# Patient Record
Sex: FEMALE | Race: BLACK OR AFRICAN AMERICAN | NOT HISPANIC OR LATINO | URBAN - METROPOLITAN AREA
[De-identification: names, ages, dates, MRNs, and addresses within clinical notes are randomized per-mention and may not be internally consistent; named-entity substitution may affect disease eponyms.]

---

## 2017-06-20 ENCOUNTER — INPATIENT (INPATIENT)
Facility: HOSPITAL | Age: 67
LOS: 0 days | Discharge: HOME | End: 2017-06-20
Attending: FAMILY MEDICINE | Admitting: FAMILY MEDICINE
Payer: MEDICARE

## 2017-06-20 VITALS
DIASTOLIC BLOOD PRESSURE: 72 MMHG | TEMPERATURE: 98 F | OXYGEN SATURATION: 100 % | RESPIRATION RATE: 18 BRPM | SYSTOLIC BLOOD PRESSURE: 111 MMHG | HEART RATE: 63 BPM

## 2017-06-20 VITALS — HEIGHT: 64 IN | WEIGHT: 179.9 LBS

## 2017-06-20 DIAGNOSIS — R05 COUGH: ICD-10-CM

## 2017-06-20 DIAGNOSIS — E66.9 OBESITY, UNSPECIFIED: ICD-10-CM

## 2017-06-20 DIAGNOSIS — R06.09 OTHER FORMS OF DYSPNEA: ICD-10-CM

## 2017-06-20 DIAGNOSIS — M17.11 UNILATERAL PRIMARY OSTEOARTHRITIS, RIGHT KNEE: ICD-10-CM

## 2017-06-20 DIAGNOSIS — R53.1 WEAKNESS: ICD-10-CM

## 2017-06-20 LAB
24R-OH-CALCIDIOL SERPL-MCNC: 43.6 NG/ML — SIGNIFICANT CHANGE UP (ref 30–100)
ALBUMIN SERPL ELPH-MCNC: 3.3 G/DL — SIGNIFICANT CHANGE UP (ref 3.3–5)
ALP SERPL-CCNC: 94 U/L — SIGNIFICANT CHANGE UP (ref 40–120)
ALT FLD-CCNC: 18 U/L — SIGNIFICANT CHANGE UP (ref 12–78)
ANION GAP SERPL CALC-SCNC: 6 MMOL/L — SIGNIFICANT CHANGE UP (ref 5–17)
APTT BLD: 36 SEC — SIGNIFICANT CHANGE UP (ref 27.5–37.4)
AST SERPL-CCNC: 38 U/L — HIGH (ref 15–37)
BASOPHILS # BLD AUTO: 0.1 K/UL — SIGNIFICANT CHANGE UP (ref 0–0.2)
BASOPHILS NFR BLD AUTO: 1.3 % — SIGNIFICANT CHANGE UP (ref 0–2)
BILIRUB SERPL-MCNC: 1 MG/DL — SIGNIFICANT CHANGE UP (ref 0.2–1.2)
BUN SERPL-MCNC: 16 MG/DL — SIGNIFICANT CHANGE UP (ref 7–23)
CALCIUM SERPL-MCNC: 9.1 MG/DL — SIGNIFICANT CHANGE UP (ref 8.5–10.1)
CHLORIDE SERPL-SCNC: 107 MMOL/L — SIGNIFICANT CHANGE UP (ref 96–108)
CK SERPL-CCNC: 131 U/L — SIGNIFICANT CHANGE UP (ref 26–192)
CK SERPL-CCNC: 153 U/L — SIGNIFICANT CHANGE UP (ref 26–192)
CO2 SERPL-SCNC: 29 MMOL/L — SIGNIFICANT CHANGE UP (ref 22–31)
CREAT SERPL-MCNC: 0.98 MG/DL — SIGNIFICANT CHANGE UP (ref 0.5–1.3)
CRP SERPL-MCNC: <0.2 MG/DL — SIGNIFICANT CHANGE UP (ref 0–0.4)
EOSINOPHIL # BLD AUTO: 0.2 K/UL — SIGNIFICANT CHANGE UP (ref 0–0.5)
EOSINOPHIL NFR BLD AUTO: 1.9 % — SIGNIFICANT CHANGE UP (ref 0–6)
ERYTHROCYTE [SEDIMENTATION RATE] IN BLOOD: 23 MM/HR — HIGH (ref 0–20)
GLUCOSE SERPL-MCNC: 89 MG/DL — SIGNIFICANT CHANGE UP (ref 70–99)
HCT VFR BLD CALC: 37.2 % — SIGNIFICANT CHANGE UP (ref 34.5–45)
HGB BLD-MCNC: 12.8 G/DL — SIGNIFICANT CHANGE UP (ref 11.5–15.5)
INR BLD: 1.08 RATIO — SIGNIFICANT CHANGE UP (ref 0.88–1.16)
LYMPHOCYTES # BLD AUTO: 2.1 K/UL — SIGNIFICANT CHANGE UP (ref 1–3.3)
LYMPHOCYTES # BLD AUTO: 26.2 % — SIGNIFICANT CHANGE UP (ref 13–44)
MCHC RBC-ENTMCNC: 32 PG — SIGNIFICANT CHANGE UP (ref 27–34)
MCHC RBC-ENTMCNC: 34.6 GM/DL — SIGNIFICANT CHANGE UP (ref 32–36)
MCV RBC AUTO: 92.8 FL — SIGNIFICANT CHANGE UP (ref 80–100)
MONOCYTES # BLD AUTO: 0.7 K/UL — SIGNIFICANT CHANGE UP (ref 0–0.9)
MONOCYTES NFR BLD AUTO: 8.5 % — SIGNIFICANT CHANGE UP (ref 2–14)
NEUTROPHILS # BLD AUTO: 4.9 K/UL — SIGNIFICANT CHANGE UP (ref 1.8–7.4)
NEUTROPHILS NFR BLD AUTO: 62.1 % — SIGNIFICANT CHANGE UP (ref 43–77)
NT-PROBNP SERPL-SCNC: 36 PG/ML — SIGNIFICANT CHANGE UP (ref 0–125)
PLATELET # BLD AUTO: 207 K/UL — SIGNIFICANT CHANGE UP (ref 150–400)
POTASSIUM SERPL-MCNC: 3.9 MMOL/L — SIGNIFICANT CHANGE UP (ref 3.5–5.3)
POTASSIUM SERPL-SCNC: 3.9 MMOL/L — SIGNIFICANT CHANGE UP (ref 3.5–5.3)
PROT SERPL-MCNC: 7 GM/DL — SIGNIFICANT CHANGE UP (ref 6–8.3)
PROTHROM AB SERPL-ACNC: 11.7 SEC — SIGNIFICANT CHANGE UP (ref 9.8–12.7)
RAPID RVP RESULT: SIGNIFICANT CHANGE UP
RBC # BLD: 4.01 M/UL — SIGNIFICANT CHANGE UP (ref 3.8–5.2)
RBC # FLD: 11.6 % — SIGNIFICANT CHANGE UP (ref 10.3–14.5)
SODIUM SERPL-SCNC: 142 MMOL/L — SIGNIFICANT CHANGE UP (ref 135–145)
TROPONIN I SERPL-MCNC: <0.015 NG/ML — SIGNIFICANT CHANGE UP (ref 0.01–0.04)
TROPONIN I SERPL-MCNC: <0.015 NG/ML — SIGNIFICANT CHANGE UP (ref 0.01–0.04)
TSH SERPL-MCNC: 1.2 UU/ML — SIGNIFICANT CHANGE UP (ref 0.36–3.74)
VIT B12 SERPL-MCNC: >2000 PG/ML — HIGH (ref 243–894)
WBC # BLD: 7.9 K/UL — SIGNIFICANT CHANGE UP (ref 3.8–10.5)
WBC # FLD AUTO: 7.9 K/UL — SIGNIFICANT CHANGE UP (ref 3.8–10.5)

## 2017-06-20 PROCEDURE — 99285 EMERGENCY DEPT VISIT HI MDM: CPT

## 2017-06-20 PROCEDURE — 71020: CPT | Mod: 26

## 2017-06-20 PROCEDURE — 93010 ELECTROCARDIOGRAM REPORT: CPT

## 2017-06-20 PROCEDURE — 71275 CT ANGIOGRAPHY CHEST: CPT | Mod: 26

## 2017-06-20 RX ORDER — AZITHROMYCIN 500 MG/1
500 TABLET, FILM COATED ORAL DAILY
Qty: 0 | Refills: 0 | Status: DISCONTINUED | OUTPATIENT
Start: 2017-06-20 | End: 2017-06-20

## 2017-06-20 RX ORDER — ASPIRIN/CALCIUM CARB/MAGNESIUM 324 MG
324 TABLET ORAL DAILY
Qty: 0 | Refills: 0 | Status: DISCONTINUED | OUTPATIENT
Start: 2017-06-20 | End: 2017-06-20

## 2017-06-20 RX ORDER — SODIUM CHLORIDE 9 MG/ML
3 INJECTION INTRAMUSCULAR; INTRAVENOUS; SUBCUTANEOUS ONCE
Qty: 0 | Refills: 0 | Status: COMPLETED | OUTPATIENT
Start: 2017-06-20 | End: 2017-06-20

## 2017-06-20 RX ORDER — ACETAMINOPHEN 500 MG
650 TABLET ORAL EVERY 6 HOURS
Qty: 0 | Refills: 0 | Status: DISCONTINUED | OUTPATIENT
Start: 2017-06-20 | End: 2017-06-20

## 2017-06-20 RX ORDER — ASPIRIN/CALCIUM CARB/MAGNESIUM 324 MG
1 TABLET ORAL
Qty: 30 | Refills: 0 | OUTPATIENT
Start: 2017-06-20 | End: 2017-07-20

## 2017-06-20 RX ORDER — AZITHROMYCIN 500 MG/1
1 TABLET, FILM COATED ORAL
Qty: 4 | Refills: 0 | OUTPATIENT
Start: 2017-06-20 | End: 2017-06-24

## 2017-06-20 RX ADMIN — Medication 200 MILLIGRAM(S): at 13:39

## 2017-06-20 RX ADMIN — AZITHROMYCIN 500 MILLIGRAM(S): 500 TABLET, FILM COATED ORAL at 13:39

## 2017-06-20 RX ADMIN — SODIUM CHLORIDE 3 MILLILITER(S): 9 INJECTION INTRAMUSCULAR; INTRAVENOUS; SUBCUTANEOUS at 09:44

## 2017-06-20 RX ADMIN — Medication 324 MILLIGRAM(S): at 12:15

## 2017-06-20 NOTE — DISCHARGE NOTE ADULT - PATIENT PORTAL LINK FT
“You can access the FollowHealth Patient Portal, offered by [facility], by registering with the following website: [email or web address]”

## 2017-06-20 NOTE — DISCHARGE NOTE ADULT - HOSPITAL COURSE
68 y/o female with no significant PMH except right knee arthritis presents to  with 2.5 weeks history of productive cough with whitemucus, worse at night, associated with dyspnea on exertion, which is not getting better, some horsiness and generalized weakness. Denies CP, dizziness,  HA, abdominal pain, fever, chills, urinary sx, BM problems, other sx.    In Ed - EKG - NSR no ST-T changes, troponin x1 neg, BNP wnl , D-dimers neg, CXR - neg, WBC wnl, renal panel normal  CTA - no PE  Vital Signs Last 24 Hrs  T(C): 36.7, Max: 36.8 (06-20 @ 08:48)  T(F): 98, Max: 98.2 (06-20 @ 08:48)  HR: 68 (68 - 68)  BP: 128/58 (128/58 - 136/81)  BP(mean): --  RR: 18 (18 - 18)  SpO2: 100% (100% - 100%)    Physical Exam: PHYSICAL EXAM:  Constitutional: NAD, awake and alert, well-developed HEENT: PERR, EOMI, Normal Hearing, MMM Neck: Soft and supple, No LAD, No JVD Respiratory: Breath sounds are clear bilaterally, No wheezing, rales or rhonchi Cardiovascular: S1 and S2, regular rate and rhythm, no Murmurs, gallops or rubs Gastrointestinal: Bowel Sounds present, soft, nontender, nondistended, no guarding, no rebound Extremities: No peripheral edema Vascular: 2+ peripheral pulses Neurological: A/O x 3, no focal deficits Musculoskeletal: 5/5 strength b/l upper and lower extremities Skin: No rashes rectal : deferred, not indicated	    Problem/Plan - 1:  ·  Problem: Productive cough.  Plan: suspected acute viral vs bacterial bronchitis vs GERD  CXR - no PNA  start Robitussin, z-pack  RVP. - pending  CTA - no PE    Problem/Plan - 2:  ·  Problem: ROSALES (dyspnea on exertion).  Plan: d -dimer neg,  CTA negative  O2 sata wnl  tropon x1 neg, , BNP neg. no EKG changes    Problem/Plan - 3:  ·  Problem: Obesity (BMI 30-39.9).  Plan: life style modification.     Problem/Plan - 4:  ·  Problem: Osteoarthritis of right knee, unspecified osteoarthritis type.  Plan: tylenol prn.     Problem/Plan - 5:  ·  Problem: Weakness.  Plan: check TSH, B12, vit D level  outpatient follow up with new PCP.       Attending Statement:  Disposition - medically optimized to be discharged home with close follow up with PCP within 1 week  complete antibiotics  return to ED if fever, abdominal pain, nausea, vomiting, chest pain, dyspnea or other concerns  Discharge plan discussed with patient, RN  Patient advised to follow up with PCP within 3-7 days  time spend 40 min

## 2017-06-20 NOTE — DISCHARGE NOTE ADULT - OTHER SIGNIFICANT FINDINGS
Complete Blood Count + Automated Diff (06.20.17 @ 09:04)    WBC Count: 7.9 K/uL    RBC Count: 4.01 M/uL    Hemoglobin: 12.8 g/dL    Hematocrit: 37.2 %    Mean Cell Volume: 92.8 fl    Mean Cell Hemoglobin: 32.0 pg    Mean Cell Hemoglobin Conc: 34.6 gm/dL    Red Cell Distrib Width: 11.6 %    Platelet Count - Automated: 207 K/uL    Auto Neutrophil #: 4.9 K/uL    Auto Lymphocyte #: 2.1 K/uL    Auto Monocyte #: 0.7 K/uL    Auto Eosinophil #: 0.2 K/uL    Auto Basophil #: 0.1 K/uL    Auto Neutrophil %: 62.1: Differential percentages must be correlated with absolute numbers for  clinical significance. %    Auto Lymphocyte %: 26.2 %    Auto Monocyte %: 8.5 %    Auto Eosinophil %: 1.9 %    Auto Basophil %: 1.3 %    Comprehensive Metabolic Panel (06.20.17 @ 09:04)    Sodium, Serum: 142 mmol/L    Potassium, Serum: 3.9 mmol/L    Chloride, Serum: 107 mmol/L    Carbon Dioxide, Serum: 29 mmol/L    Anion Gap, Serum: 6 mmol/L    Blood Urea Nitrogen, Serum: 16 mg/dL    Creatinine, Serum: 0.98 mg/dL    Glucose, Serum: 89 mg/dL    Calcium, Total Serum: 9.1 mg/dL    Protein Total, Serum: 7.0 gm/dL    Albumin, Serum: 3.3 g/dL    Bilirubin Total, Serum: 1.0 mg/dL    Alkaline Phosphatase, Serum: 94 U/L    Aspartate Aminotransferase (AST/SGOT): 38 U/L    Alanine Aminotransferase (ALT/SGPT): 18 U/L    eGFR if Non : 60: Interpretative comment  The units for eGFR are ml/min/1.73m2 (normalized body surface area). The  eGFR is calculated from a serum creatinine using the CKD-EPI equation.  Other variables required for calculation are race, age and sex. Among  patients w60: ith chronic kidney disease (CKD), the eGFR is useful in  determining the stage of disease according to KDOQI CKD classification.  All eGFR results are reported numerically with the following  interpretation.          GFR                    With60:                  Without     (ml/min/1.73 m2)    Kidney Damage       Kidney Damage        >= 90                    Stage 1                     Normal        60-89                    Stage 2                     Decreased GFR        :      Stage 3                     Stage 3        15-29                    Stage 4                     Stage 4        < 15                      Stage 5                     Stage 5  Each stage of CKD assumes that the associated GFR level has been in  eff60: ect for at least 3 months. Determination of stages one and two (with  eGFR > 59 ml/min/m2) requires estimation of kidney damage for at least 3  months as defined by structural or functional abnormalities.  Limitations: All estimates of GFR will be les60: s accurate for patients at  extremes of muscle mass (including but not limited to frail elderly,  critically ill, or cancer patients), those with unusual diets, and those  with conditions associated with reduced secretion or extrarenal  elimination of60:  creatinine. The eGFR equation is not recommended for use  in patients with unstable creatinine levels. mL/min/1.73M2    eGFR if African American: 69 mL/min/1.73M2    Troponin I, Serum (06.20.17 @ 09:04)    Troponin I, Serum: <0.015: High Sensitivity Troponin and new reference  range effective 7/6/2016 ng/mL    Creatine Kinase, Serum (06.20.17 @ 09:04)    Creatine Kinase, Serum: 153 U/L    Serum Pro-Brain Natriuretic Peptide (06.20.17 @ 09:04)    Serum Pro-Brain Natriuretic Peptide: 36 pg/mL     CT ANGIO CHEST PE PROTOCOL IC                        PROCEDURE DATE:  06/20/2017    INTERPRETATION:  Clinical information: Dyspnea. Rule out pulmonary   embolism  FINDINGS:  LOWER NECK: Within normal limits.  AXILLA, MEDIASTINUM AND CLARISA: No lymphadenopathy.  VESSELS: Adequate opacification of the pulmonary arterial tree. Limited   evaluation of the subsegmental vasculature in the right lung. No embolism   identified. Normal caliber aorta and main pulmonary artery.  HEART: Heart size is normal.No pericardial effusion.  PLEURA: No pleural effusion.  LUNGS AND LARGE AIRWAYS: Patent central airways. No pulmonary nodules.  VISUALIZED UPPER ABDOMEN: Within normal limits.  BONES: No acute abnormality.  CHEST WALL:  Unremarkable  IMPRESSION: No pulmonary embolism.

## 2017-06-20 NOTE — ED PROVIDER NOTE - ATTENDING CONTRIBUTION TO CARE
68 yo F otherwise healthy presents c/o cough with clear phlegm x 2 weeks. +ROSALES. Denies CP. Pt last saw her PCP in [place] last week but did not have work-up since she moved to [place] and would be unable to continue her care. Pt does not smoke. On exam, lungs are clear to auscultation without wheezing, rales, or rhonchi. Agree with Resident's assessment and plan.

## 2017-06-20 NOTE — ED PROVIDER NOTE - OBJECTIVE STATEMENT
66 y/o F no sig medical hx c/o 1-2 week hx of dry cough now with clear sputum x few days. Pt also reports exertional dyspnea x 1 week . Denies any f/c/s, cp, n/v/d, leg swelling. No recent travel. Pt reports having stress test in Jan and was told everything was normal. no sick contacts

## 2017-06-20 NOTE — H&P ADULT - NSHPREVIEWOFSYSTEMS_GEN_ALL_CORE
REVIEW OF SYSTEMS:    CONSTITUTIONAL: No weakness, fevers or chills  EYES/ENT: No visual changes;  No vertigo or throat pain   NECK: No pain or stiffness  RESPIRATORY: + cough, wheezing, hemoptysis; No shortness of breath  CARDIOVASCULAR: No chest pain or palpitations  GASTROINTESTINAL: No abdominal or epigastric pain. No nausea, vomiting, or hematemesis; No diarrhea or constipation. No melena or hematochezia.  GENITOURINARY: No dysuria, frequency or hematuria  NEUROLOGICAL: No numbness or weakness  SKIN: No itching, burning, rashes, or lesions   All other review of systems is negative unless indicated above.

## 2017-06-20 NOTE — DISCHARGE NOTE ADULT - PLAN OF CARE
resolve follow up with PCP within 2- 3 days for further work-up complete antibiotics and use Robitussin for cough as needed, return to ED if chest pain, palpitations, dizziness, headaches, any other concerns follow up with PCP within 1 week for further work-up tylenol prn OTC

## 2017-06-20 NOTE — DISCHARGE NOTE ADULT - SECONDARY DIAGNOSIS.
Productive cough Weakness Osteoarthritis of right knee, unspecified osteoarthritis type Obesity (BMI 30-39.9)

## 2017-06-20 NOTE — H&P ADULT - NSHPLABSRESULTS_GEN_ALL_CORE
06-20    142  |  107  |  16  ----------------------------<  89  3.9   |  29  |  0.98    Ca    9.1      20 Jun 2017 09:04    TPro  7.0  /  Alb  3.3  /  TBili  1.0  /  DBili  x   /  AST  38<H>  /  ALT  18  /  AlkPhos  94  06-20                            12.8   7.9   )-----------( 207      ( 20 Jun 2017 09:04 )             37.2       CARDIAC MARKERS ( 20 Jun 2017 09:04 )  <0.015 ng/mL / x     / 153 U/L / x     / x            LIVER FUNCTIONS - ( 20 Jun 2017 09:04 )  Alb: 3.3 g/dL / Pro: 7.0 gm/dL / ALK PHOS: 94 U/L / ALT: 18 U/L / AST: 38 U/L / GGT: x             PT/INR - ( 20 Jun 2017 09:04 )   PT: 11.7 sec;   INR: 1.08 ratio         PTT - ( 20 Jun 2017 09:04 )  PTT:36.0 sec    CHEST P.A. LATERAL                            PROCEDURE DATE:  06/20/2017        INTERPRETATION:  Exam Date: 6/20/2017 9:39 AM    History: Chest pain and shortness of breath    Technique: Frontal and lateral views of the chest with no prior studies   available for comparison    Findings:    The heart is normal in size.  The lungs are grossly clear. The apices and   hemidiaphragms are unremarkable. Degenerative changes of the visualized   osseous structures.    Kyphosis centered around the lower thoracic spine.    Impression:    No acute disease

## 2017-06-20 NOTE — H&P ADULT - ASSESSMENT
66 y/o female with no significant PMH except right knee arthritis presents to  with 2.5 weeks history of productive cough with whitemucus, worse at night, associated with dyspnea on exertion, which is not getting better, some horsiness and generalized weakness. Denies CP, dizziness,  HA, abdominal pain, fever, chills, urinary sx, BM problems, other sx.    In Ed - EKG - NSR no ST-T changes, troponin x1 neg, BNP wnl , D-dimers neg, CXR - neg, WBC wnl, renal panel normal

## 2017-06-20 NOTE — ED PROVIDER NOTE - PROGRESS NOTE DETAILS
Jovi Carroll for Dr. Rosas: 66 yo F otherwise healthy presents c/o cough with clear phlegm x 2 weeks. +ROSALES. Denies CP. Pt last saw her PCP in [place] last week but did not have work-up since she moved to [place] and would be unable to continue her care. Pt does not smoke. On exam, lungs are clear to auscultation without wheezing, rales, or rhonchi. Jovi Carroll for Dr. Rosas: 66 yo F otherwise healthy presents c/o cough with clear phlegm x 2 weeks. +ROSALES. Denies CP. Pt last saw her PCP in [place] last week but did not have work-up since she moved to [place] and would be unable to continue her care. Pt does not smoke. On exam, lungs are clear to auscultation without wheezing, rales, or rhonchi. Agree with Resident's assessment and plan.

## 2017-06-20 NOTE — ED PROVIDER NOTE - MEDICAL DECISION MAKING DETAILS
66 y/o F no med hx , not on meds c/o 1-2  weeks hx of cough/ exertional dyspnea. Pt hemodyn stable, saturating 100 % on RA. Plan to obtian ekg to r./o stemi/ischemia. labs including bnp /xray chest to r/o chf/pna.

## 2017-06-20 NOTE — DISCHARGE NOTE ADULT - MEDICATION SUMMARY - MEDICATIONS TO TAKE
I will START or STAY ON the medications listed below when I get home from the hospital:    aspirin 81 mg oral tablet  -- 1 tab(s) by mouth once a day  -- Take with food or milk.    -- Indication: For ROSALES (dyspnea on exertion)    guaiFENesin 100 mg/5 mL oral liquid  -- 10 milliliter(s) by mouth every 6 hours  -- Indication: For Productive cough    azithromycin 250 mg oral tablet  -- 1 tab(s) by mouth once a day start on 6/21/17  -- Do not take dairy products, antacids, or iron preparations within one hour of this medication.  Finish all this medication unless otherwise directed by prescriber.    -- Indication: For Productive cough    multivitamin  -- 1 tab(s) by mouth once a day  -- Indication: For vitamins    Calcium 600+D oral tablet  -- 1 tab(s) by mouth 2 times a day  -- Indication: For vitamins

## 2017-06-20 NOTE — DISCHARGE NOTE ADULT - CARE PLAN
Principal Discharge DX:	ROSALES (dyspnea on exertion)  Goal:	resolve  Instructions for follow-up, activity and diet:	follow up with PCP within 2- 3 days for further work-up  Secondary Diagnosis:	Productive cough  Instructions for follow-up, activity and diet:	complete antibiotics and use Robitussin for cough as needed, return to ED if chest pain, palpitations, dizziness, headaches, any other concerns  Secondary Diagnosis:	Weakness  Instructions for follow-up, activity and diet:	follow up with PCP within 1 week for further work-up  Secondary Diagnosis:	Osteoarthritis of right knee, unspecified osteoarthritis type  Instructions for follow-up, activity and diet:	tylenol prn OTC  Secondary Diagnosis:	Obesity (BMI 30-39.9)

## 2017-06-20 NOTE — DISCHARGE NOTE ADULT - ADDITIONAL INSTRUCTIONS
follow up with PCP within 1 week  complete antibiotics  return to ED if fever, abdominal pain, nausea, vomiting, chest pain, dyspnea or other concerns

## 2017-06-20 NOTE — DISCHARGE NOTE ADULT - CARE PROVIDER_API CALL
Alfred Murcia), Family Medicine  [address] [place] [address]  Phone: [phone]  Fax: [phone]    Refugio Carr), Family Medicine  [address] [place] [address]  Phone: [phone]  Fax: [phone]

## 2017-06-20 NOTE — H&P ADULT - ATTENDING COMMENTS
Dispo - no clear indication for admission at this time, CTA, repeat troponin, d/c planning later in the day

## 2017-06-29 DIAGNOSIS — M17.11 UNILATERAL PRIMARY OSTEOARTHRITIS, RIGHT KNEE: ICD-10-CM

## 2017-06-29 DIAGNOSIS — R53.1 WEAKNESS: ICD-10-CM

## 2017-06-29 DIAGNOSIS — R06.00 DYSPNEA, UNSPECIFIED: ICD-10-CM

## 2017-06-29 DIAGNOSIS — R05 COUGH: ICD-10-CM

## 2017-06-29 DIAGNOSIS — E66.9 OBESITY, UNSPECIFIED: ICD-10-CM

## 2021-02-14 ENCOUNTER — EMERGENCY (EMERGENCY)
Facility: HOSPITAL | Age: 71
LOS: 1 days | Discharge: HOME | End: 2021-02-14
Attending: EMERGENCY MEDICINE
Payer: COMMERCIAL

## 2021-02-14 VITALS
TEMPERATURE: 99 F | RESPIRATION RATE: 16 BRPM | SYSTOLIC BLOOD PRESSURE: 143 MMHG | HEART RATE: 75 BPM | DIASTOLIC BLOOD PRESSURE: 72 MMHG | OXYGEN SATURATION: 95 % | WEIGHT: 199.96 LBS | HEIGHT: 62 IN

## 2021-02-14 PROCEDURE — 72170 X-RAY EXAM OF PELVIS: CPT

## 2021-02-14 PROCEDURE — 72125 CT NECK SPINE W/O DYE: CPT | Mod: 26

## 2021-02-14 PROCEDURE — 72125 CT NECK SPINE W/O DYE: CPT

## 2021-02-14 PROCEDURE — 72170 X-RAY EXAM OF PELVIS: CPT | Mod: 26

## 2021-02-14 PROCEDURE — 99284 EMERGENCY DEPT VISIT MOD MDM: CPT | Mod: 25

## 2021-02-14 PROCEDURE — 99285 EMERGENCY DEPT VISIT HI MDM: CPT

## 2021-02-14 PROCEDURE — 70450 CT HEAD/BRAIN W/O DYE: CPT

## 2021-02-14 PROCEDURE — 70450 CT HEAD/BRAIN W/O DYE: CPT | Mod: 26

## 2021-02-14 NOTE — ED PROVIDER NOTE - PHYSICAL EXAMINATION
Constitutional: Awake, Alert. NAD. Well appearing, well nourished. Cooperative. VSS.  HEAD: NC/AT; no signs of trauma   EYES: Conjunctiva and sclera are clear bilaterally. EOMI.   ENT: No rhinorrhea, normal pharynx, oropharynx patent, no tonsillar exudates or enlargement, MMM, no erythema, no drooling or stridor.   NECK: Supple, non-tender. No nuchal rigidity. FROM. No midline or paraspinal TTP.  CARDIOVASCULAR: Normal S1, S2; RRR. No audible murmurs. No chest wall ttp. Radial pulses +2 B/L.  RESPIRATORY: Speaking full sentences. Normal respiratory effort; breath sounds CTAB, No WRR. No accessory muscle use.   ABDOMEN: Soft; NTND. No CVAT B/L. +BS x4 quadrants.  EXTREMITIES: Full passive and active ROM in all extremities; non-tender to palpation; distal pulses palpable and symmetric, no edema, no crepitus or step off.  SKIN: Warm, dry; good skin turgor, no apparent lesions or rashes, no ecchymosis, brisk capillary refill.  NEURO: AAOx3 follows commands. No facial droop. CN 2-12 grossly intact. No truncal ataxia. Steady gait. Moving all ext spontaneously Constitutional: Awake, Alert. NAD. Well appearing, well nourished. Cooperative. VSS.  HEAD: NC/AT; no signs of trauma   EYES: Conjunctiva and sclera are clear bilaterally. EOMI.   ENT: No rhinorrhea, normal pharynx, oropharynx patent, no tonsillar exudates or enlargement, MMM, no erythema, no drooling or stridor.   NECK: Supple, non-tender. No nuchal rigidity. FROM. No midline or paraspinal TTP.  CARDIOVASCULAR: Normal S1, S2; RRR. No audible murmurs. No chest wall ttp. Radial pulses +2 B/L.  RESPIRATORY: Speaking full sentences. Normal respiratory effort; breath sounds CTAB, No WRR. No accessory muscle use.   ABDOMEN: Soft; NTND. No CVAT B/L. +BS x4 quadrants.  EXTREMITIES: Full passive and active ROM in all extremities; non-tender to palpation; distal pulses palpable and symmetric, no edema, no crepitus or step off.  SKIN: Warm, dry; good skin turgor, no apparent lesions or rashes, no ecchymosis, brisk capillary refill.  NEURO: AAOx3 follows commands. No facial droop. CN 2-12 grossly intact. No truncal ataxia. Steady gait. Moving all ext spontaneously. gcs 15

## 2021-02-14 NOTE — ED PROVIDER NOTE - CLINICAL SUMMARY MEDICAL DECISION MAKING FREE TEXT BOX
70 y.o F on ASA/ p/w fall 15 hours ago, mechanical fall on black ice, fell on buttocks and occiput, no loc, currently denying pain    VSS, well aman elderly Female, AAO x 3, speaking full sentences     No signs of facial or head trauma    Neck FROM, no midline or paraspinal ttp,     Low suspicion for ICH, C spine injury/fracture, however Pt is on ASA and is greater than 65 y.o will get Head CT, C spine and Pelvis XR

## 2021-02-14 NOTE — ED ADULT TRIAGE NOTE - CHIEF COMPLAINT QUOTE
Pt. comes ambulating into ED complaining of slip and fall on ice. Pt. denies LOC. Pt. states she took 161 of ASA and 1000 of Tylenol for pain. Pt. denies Blood thinners. Denies headache right now, Denies dizziness. Pt. denies any pain at this time.

## 2021-02-14 NOTE — ED PROVIDER NOTE - OBJECTIVE STATEMENT
70 y.o F with a PMHx of HTN on ASA presents to the ED with c/o fall on black top s/p slipping on ice this morning 4:15 AM. Pt has taken 2 ASA today. Pt states that the impact was quite significant and has concerns for her knees. Pt endorses back of the head absorbing much of the impact associated with the fall but first landed on her buttocks. Pt has no other complaints at this time.     Denies LOC, HA, Dizziness, vision changes, SOB, CP, NVD and bleeding

## 2021-02-14 NOTE — ED PROVIDER NOTE - ADDITIONAL NOTES AND INSTRUCTIONS:
PT was evaluated At [facility] ED and was found to have a condition that warranted time of to rest and heal from WORK/SCHOOL.   Vinnie Meyer PA-C

## 2021-02-14 NOTE — ED PROVIDER NOTE - NSFOLLOWUPINSTRUCTIONS_ED_ALL_ED_FT
Patient education: Taking care of bruises (The Basics)  View in [employer]  Written by the doctors and editors at [facility]  What are bruises?  Bruises happen when blood vessels under the skin break, but the skin isn't cut. Blood leaks into the tissues under the skin. Bruises start off red in color, and then turn blue or purple. As they heal, bruises can turn green and yellow (figure 1). Most bruises heal in 1 to 2 weeks, but some take longer.    Bruises can happen when people get hurt, fall, or bump themselves. People usually have pain and swelling in the area of the bruise. Sometimes, the swelling happens right away. Other times, the swelling starts 1 or 2 days later.    Some people bruise more easily and get worse bruises. These include people who have conditions that keep the blood from clotting normally and people who take medicines to prevent blood clots.    How are bruises treated?  A bruise will get better on its own. But to feel better and help your bruise heal, you can:    ?Put a cold gel pack, bag of ice, or bag of frozen vegetables on the injured area every 1 to 2 hours, for 15 minutes each time. Put a thin towel between the ice (or other cold object) and your skin. Use the ice (or other cold object) for at least 6 hours after your injury. Some people find it helpful to ice longer, even up to 2 days after their injury.    ?Raise the area, if possible – Raising the area above the level of your heart helps to reduce swelling.    ?Take medicine to reduce the pain and swelling – To treat pain, you can take acetaminophen (sample brand name: Tylenol). To treat pain and swelling, you can take ibuprofen (sample brand names: Advil, Motrin). But people who have certain conditions or take certain medicines should not take ibuprofen. If you are unsure, ask your doctor or nurse if you can take ibuprofen.    You should not:    ?Put a warm pack or heating pad on your bruise    ?Stick a needle or other object in your bruise to drain it    When should I call the doctor or nurse?  Call your doctor or nurse if:    ?You get a fever    ?Your bruise causes your joints to swell    ?You can't move or walk because of your bruise    ?You get bruises for no reason or have unusual bleeding, such as from your gums or in your urine

## 2021-02-14 NOTE — ED PROVIDER NOTE - PROGRESS NOTE DETAILS
Vinnie Meyer PA-C:   Pt discussed at length with attending HPI/ROS/PE confirmed Pt seen resting comfortable in no acute distress in chair.   NEURO: A&O x 3, CN II-Xii GI, MAEx 4,  5/5/ motors, = sensation, no pronator drift or ataxia. MS: no midline ttp. strength intact, ARABELLA, no palpable bony abnormalities.   PLAN: PT with stable VS, no acute distress, non toxic appearing, tolerating PO in the ED, Pt with no acute findings, neuro vasc intact, ARABELLA, Pt to be dc home with supportive care, educated about when to return to the ED if needed. PT verbalizes that he understands all instructions and results. Pt informed that ED is open and available 24/7 365 days a yr, encouraged to return to the ED if they have any change in condition, or feel the need for revaluation.

## 2021-02-14 NOTE — ED PROVIDER NOTE - PATIENT PORTAL LINK FT
You can access the FollowMyHealth Patient Portal offered by [facility] by registering at the following website: [email or web address]. By joining [employer]’s FollowMyHealth portal, you will also be able to view your health information using other applications (apps) compatible with our system.

## 2021-02-16 PROBLEM — E66.9 OBESITY, UNSPECIFIED: Chronic | Status: ACTIVE | Noted: 2017-06-20

## 2021-02-16 PROBLEM — M17.11 UNILATERAL PRIMARY OSTEOARTHRITIS, RIGHT KNEE: Chronic | Status: ACTIVE | Noted: 2017-06-20

## 2021-08-17 NOTE — ED ADULT NURSE NOTE - CINV DISCH TEACH PARTICIP
History and Physical    Patient:  Queen John  MRN: [id number]  YOB: 1946    CHIEF COMPLAINT: LUQ and epigastric pain    HISTORY OF PRESENT ILLNESS:   The patient is a 76 y.o. female who presents with LUQ and epigastric pain. CT A/P form 7/14/2021 showed bilateral nonobstructing kidney stones, four kidney stones on the left with largest = 0.5 cm. Patient is on K citrate.      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 10 meq po bid    Past Medical History:    Past Medical History:   Diagnosis Date    Allergic rhinitis     Arthritis     Generalized    Borderline diabetes     Caffeine use     2 coffee day    COPD (chronic obstructive pulmonary disease) (HCC)     EARLY STAGE    GERD (gastroesophageal reflux disease)     Kidney stones     Mumps     MVP (mitral valve prolapse)     Nausea & vomiting     Palpitations     PONV (postoperative nausea and vomiting)     Ringing in ears     Schamberg's disease     Thoracic outlet syndrome     Type II or unspecified type diabetes mellitus without mention of complication, not stated as uncontrolled     Borderline, no meds    Under care of team 08/06/2021    PCP: Dr. Tolu Kendall, last visit 7/2021    Under care of team 08/06/2021    Cardiologist: Valeri Mason, last visit 5/2021    Under care of team 08/06/2021    Urology: Dr. Melinda Mishra, last visit 7/2021    Under care of team 08/06/2021    Endo: Dr. Evette Hull, [facility], last visit 5/2021       Past Surgical History:    Past Surgical History:   Procedure Laterality Date    ACHILLES TENDON SURGERY Bilateral     ANGIOPLASTY      APPENDECTOMY      BACK SURGERY      BREAST BIOPSY      CARPAL TUNNEL RELEASE Bilateral     CERVICAL SPINE SURGERY      CHOLECYSTECTOMY      COLONOSCOPY      CYSTOSCOPY      with stent 2010    DIAGNOSTIC CARDIAC CATH LAB PROCEDURE      10 YEARS AGO    ENDOSCOPY, COLON, DIAGNOSTIC      EYE SURGERY Bilateral     Cataracts    FINGER TRIGGER RELEASE Bilateral     HYSTERECTOMY      JOINT REPLACEMENT Bilateral     Knees    KNEE ARTHROSCOPY Bilateral     LITHOTRIPSY      LITHOTRIPSY      PARATHYROIDECTOMY      STOMACH SURGERY      Cyst removal    THYROIDECTOMY      TONSILLECTOMY      VASCULAR SURGERY      THORACIC OUTLET RELEASE       Medications Prior to Admission:    Prior to Admission medications    Medication Sig Start Date End Date Taking? Authorizing Provider   clotrimazole-betamethasone (LOTRISONE) 1-0.05 % lotion as needed  8/2/21   Historical Provider, MD   FEROSUL 325 (65 Fe) MG tablet Take 325 mg by mouth daily  8/2/21   Historical Provider, MD   HYDROcodone-acetaminophen (Dana Heman) 5-325 MG per tablet take 1-2 tablets by mouth if needed every 12 hours for SEVERE pain 7/14/21   Historical Provider, MD   SYNTHROID 137 MCG tablet Take 137 mcg by mouth Daily  8/2/21   Historical Provider, MD   ondansetron (ZOFRAN-ODT) 8 MG TBDP disintegrating tablet dissolve 1 tablet ON TONGUE every 12 hours if needed for nausea 7/14/21   Historical Provider, MD   metoprolol (LOPRESSOR) 100 MG tablet 100 mg 2 times daily  8/2/21   Historical Provider, MD   Cyanocobalamin (VITAMIN B 12 PO) Take by mouth Patient takes vit-B12 shots, last shot will be 9/21/2021, she did a series of 8 shots    Historical Provider, MD   traMADol (ULTRAM) 50 MG tablet Take 50 mg by mouth every 6 hours as needed for Pain.     Historical Provider, MD   gentamicin (GARAMYCIN) 0.1 % cream Apply topically 2 times daily as needed Apply topically 3 times daily. Historical Provider, MD   triamcinolone (KENALOG) 0.025 % cream Apply topically as needed Apply Topically    Historical Provider, MD   potassium citrate (UROCIT-K) 10 MEQ (1080 MG) extended release tablet Take 1 tablet by mouth 2 times daily 8/3/21   Jeanna Gorman MD   Cholecalciferol (VITAMIN D3) 125 MCG (5000 UT) TABS Take 2,000 Units by mouth daily     Historical Provider, MD   Ascorbic Acid (VITAMIN C) 1000 MG tablet Take 500 mg by mouth daily     Historical Provider, MD   omeprazole (PRILOSEC) 40 MG capsule Take 40 mg by mouth as needed  5/25/15   Historical Provider, MD   aspirin 81 MG tablet Take 81 mg by mouth daily. Historical Provider, MD   fluticasone-salmeterol (ADVAIR) 250-50 MCG/DOSE AEPB Inhale 1 puff into the lungs daily. Historical Provider, MD   meloxicam (MOBIC) 15 MG tablet Take 15 mg by mouth daily. Historical Provider, MD   montelukast (SINGULAIR) 10 MG tablet Take 10 mg by mouth nightly.     Historical Provider, MD   gemfibrozil (LOPID) 600 MG tablet Take 600 mg by mouth daily     Historical Provider, MD       Allergies:  Latex, Other, Adhesive tape, Bactrim [sulfamethoxazole-trimethoprim], Codeine, Iodine, Pcn [penicillins], and Morphine    Social History:    Social History     Socioeconomic History    Marital status:      Spouse name: Not on file    Number of children: Not on file    Years of education: Not on file    Highest education level: Not on file   Occupational History    Not on file   Tobacco Use    Smoking status: Never Smoker    Smokeless tobacco: Never Used   Substance and Sexual Activity    Alcohol use: No     Comment: rare    Drug use: No    Sexual activity: Not on file   Other Topics Concern    Not on file   Social History Narrative    Not on file     Social Determinants of Health     Financial Resource Strain:     Difficulty of Paying Living Expenses:    Food Insecurity:     Worried About Running Out of Food in the Last Year:    [address] in the Last Year:    Transportation Needs:     Lack of Transportation (Medical):  Lack of Transportation (Non-Medical):    Physical Activity:     Days of Exercise per Week:     Minutes of Exercise per Session:    Stress:     Feeling of Stress :    Social Connections:     Frequency of Communication with Friends and Family:     Frequency of Social Gatherings with Friends and Family:     Attends Orthodoxy Services:     Active Member of Clubs or Organizations:     Attends Club or Organization Meetings:     Marital Status:    Intimate Partner Violence:     Fear of Current or Ex-Partner:     Emotionally Abused:     Physically Abused:     Sexually Abused:        Family History:    Family History   Problem Relation Age of Onset    Diabetes Mother        REVIEW OF SYSTEMS:  Constitutional: negative  Eyes: negative  Respiratory: negative  Cardiovascular: negative  Gastrointestinal: negative  Genitourinary: see HPI  Musculoskeletal: negative  Skin: negative   Neurological: negative  Hematological/Lymphatic: negative  Psychological: negative      Physical Exam:      No data found. Constitutional: Patient in no acute distress; Neuro: alert and oriented to person place and time. Psych: Mood and affect normal.  Lungs: Respiratory effort normal  Cardiovascular:  Normal peripheral pulses. Regular rate. Abdomen: Soft, non-tender, non-distended        LABS:   No results for input(s): WBC, HGB, HCT, MCV, PLT in the last 72 hours. No results for input(s): NA, K, CL, CO2, PHOS, BUN, CREATININE in the last 72 hours. Invalid input(s): CA  No results found for: PSA    Additional Lab/culture results:    Urinalysis: No results for input(s): COLORU, PHUR, LABCAST, WBCUA, RBCUA, MUCUS, TRICHOMONAS, YEAST, BACTERIA, CLARITYU, SPECGRAV, LEUKOCYTESUR, UROBILINOGEN, Orpah Kurk in the last 72 hours.     Invalid input(s): Jose Font -----------------------------------------------------------------  Imaging Results:    Assessment and Plan   Impression:    76 y.o. female with bilateral nonobstructing renal stones, four kidney stones on left with largest = 0.5 cm    Plan:   OR today for Cystoscopy, Left ureteroscopy and Holmium laser lithotripsy with ureteral stent insertion under [facility].     Lina hSah MD  10:06 PM 8/16/2021 Patient

## 2022-01-25 NOTE — ED ADULT TRIAGE NOTE - TEMPERATURE IN CELSIUS (DEGREES C)
Chart was reviewed for overdue Proactive [facility] Encounters (ADALID)  topics  Updates were requested from care everywhere  Health Maintenance has been updated  LINKS immunization registry triggered  Fit kit ordered    
37.1

## 2023-06-09 ENCOUNTER — OFFICE (OUTPATIENT)
Dept: URBAN - METROPOLITAN AREA CLINIC 94 | Facility: CLINIC | Age: 73
Setting detail: OPHTHALMOLOGY
End: 2023-06-09
Payer: COMMERCIAL

## 2023-06-09 DIAGNOSIS — H25.13: ICD-10-CM

## 2023-06-09 DIAGNOSIS — H47.233: ICD-10-CM

## 2023-06-09 DIAGNOSIS — H40.033: ICD-10-CM

## 2023-06-09 PROCEDURE — 92250 FUNDUS PHOTOGRAPHY W/I&R: CPT | Performed by: OPHTHALMOLOGY

## 2023-06-09 PROCEDURE — 92014 COMPRE OPH EXAM EST PT 1/>: CPT | Performed by: OPHTHALMOLOGY

## 2023-06-09 ASSESSMENT — VISUAL ACUITY
OS_BCVA: 20/25-1
OD_BCVA: 20/25-1

## 2023-06-09 ASSESSMENT — SPHEQUIV_DERIVED
OD_SPHEQUIV: 0.875
OS_SPHEQUIV: 0.875

## 2023-06-09 ASSESSMENT — KERATOMETRY
OS_K2POWER_DIOPTERS: 45.75
OS_K1POWER_DIOPTERS: 45.50
OD_K2POWER_DIOPTERS: 45.50
OD_K1POWER_DIOPTERS: 45.25
METHOD_AUTO_MANUAL: AUTO
OD_AXISANGLE_DEGREES: 058
OS_AXISANGLE_DEGREES: 171

## 2023-06-09 ASSESSMENT — TONOMETRY
OS_IOP_MMHG: 11
OD_IOP_MMHG: 12
OD_IOP_MMHG: 18
OS_IOP_MMHG: 18

## 2023-06-09 ASSESSMENT — REFRACTION_AUTOREFRACTION
OS_AXIS: 152
OD_AXIS: 138
OD_CYLINDER: -0.25
OS_CYLINDER: -0.75
OD_SPHERE: +1.00
OS_SPHERE: +1.25

## 2023-06-09 ASSESSMENT — CONFRONTATIONAL VISUAL FIELD TEST (CVF)
OD_FINDINGS: FULL
OS_FINDINGS: FULL

## 2023-06-09 ASSESSMENT — AXIALLENGTH_DERIVED
OS_AL: 22.5214
OD_AL: 22.6054

## 2023-06-09 ASSESSMENT — PACHYMETRY
OD_CT_CORRECTION: -1
OS_CT_UM: 529
OS_CT_CORRECTION: 1
OD_CT_UM: 559

## 2023-09-21 ENCOUNTER — APPOINTMENT (OUTPATIENT)
Dept: OBGYN | Facility: CLINIC | Age: 73
End: 2023-09-21
Payer: MEDICARE

## 2023-09-21 VITALS
HEIGHT: 62 IN | SYSTOLIC BLOOD PRESSURE: 140 MMHG | DIASTOLIC BLOOD PRESSURE: 92 MMHG | WEIGHT: 197 LBS | BODY MASS INDEX: 36.25 KG/M2

## 2023-09-21 DIAGNOSIS — Z78.9 OTHER SPECIFIED HEALTH STATUS: ICD-10-CM

## 2023-09-21 DIAGNOSIS — N39.3 STRESS INCONTINENCE (FEMALE) (MALE): ICD-10-CM

## 2023-09-21 DIAGNOSIS — B36.9 SUPERFICIAL MYCOSIS, UNSPECIFIED: ICD-10-CM

## 2023-09-21 DIAGNOSIS — Z12.39 ENCOUNTER FOR OTHER SCREENING FOR MALIGNANT NEOPLASM OF BREAST: ICD-10-CM

## 2023-09-21 DIAGNOSIS — Z80.7 FAMILY HISTORY OF OTHER MALIGNANT NEOPLASMS OF LYMPHOID, HEMATOPOIETIC AND RELATED TISSUES: ICD-10-CM

## 2023-09-21 DIAGNOSIS — R30.0 DYSURIA: ICD-10-CM

## 2023-09-21 LAB
BILIRUB UR QL STRIP: NORMAL
GLUCOSE UR-MCNC: NORMAL
HCG UR QL: 0.2 EU/DL
HGB UR QL STRIP.AUTO: NORMAL
KETONES UR-MCNC: NORMAL
LEUKOCYTE ESTERASE UR QL STRIP: NORMAL
NITRITE UR QL STRIP: NORMAL
PH UR STRIP: 7
PROT UR STRIP-MCNC: NORMAL
SP GR UR STRIP: 1.02

## 2023-09-21 PROCEDURE — 81000 URINALYSIS NONAUTO W/SCOPE: CPT

## 2023-09-21 PROCEDURE — 99387 INIT PM E/M NEW PAT 65+ YRS: CPT | Mod: GY

## 2023-09-21 RX ORDER — NYSTATIN 100000 1/G
100000 POWDER TOPICAL
Qty: 1 | Refills: 0 | Status: ACTIVE | COMMUNITY
Start: 2023-09-21 | End: 1900-01-01

## 2023-09-21 RX ORDER — CHLORHEXIDINE GLUCONATE 4 %
1000 LIQUID (ML) TOPICAL
Refills: 0 | Status: ACTIVE | COMMUNITY

## 2023-09-21 RX ORDER — HYDROCHLOROTHIAZIDE 12.5 MG/1
12.5 CAPSULE ORAL
Refills: 0 | Status: ACTIVE | COMMUNITY

## 2023-09-21 RX ORDER — ASPIRIN ENTERIC COATED TABLETS 81 MG 81 MG/1
81 TABLET, DELAYED RELEASE ORAL
Refills: 0 | Status: ACTIVE | COMMUNITY

## 2023-09-22 PROBLEM — Z80.7 FAMILY HISTORY OF LYMPHOMA: Status: ACTIVE | Noted: 2023-09-21

## 2023-09-22 PROBLEM — Z78.9 SOCIAL ALCOHOL USE: Status: ACTIVE | Noted: 2023-09-21

## 2023-09-22 PROBLEM — Z78.9 DOES NOT USE ILLICIT DRUGS: Status: ACTIVE | Noted: 2023-09-21

## 2023-09-22 PROBLEM — Z78.9 NON-SMOKER: Status: ACTIVE | Noted: 2023-09-21

## 2023-09-22 LAB
APPEARANCE: CLEAR
BILIRUBIN URINE: NEGATIVE
BLOOD URINE: NEGATIVE
COLOR: YELLOW
GLUCOSE QUALITATIVE U: NEGATIVE MG/DL
KETONES URINE: NEGATIVE MG/DL
LEUKOCYTE ESTERASE URINE: NEGATIVE
NITRITE URINE: NEGATIVE
PH URINE: 7
PROTEIN URINE: NEGATIVE MG/DL
SPECIFIC GRAVITY URINE: 1.02
UROBILINOGEN URINE: 0.2 MG/DL

## 2023-11-01 ENCOUNTER — OFFICE (OUTPATIENT)
Dept: URBAN - METROPOLITAN AREA CLINIC 94 | Facility: CLINIC | Age: 73
Setting detail: OPHTHALMOLOGY
End: 2023-11-01
Payer: COMMERCIAL

## 2023-11-01 DIAGNOSIS — H25.13: ICD-10-CM

## 2023-11-01 DIAGNOSIS — H25.11: ICD-10-CM

## 2023-11-01 PROCEDURE — 92136 OPHTHALMIC BIOMETRY: CPT | Performed by: OPHTHALMOLOGY

## 2023-11-01 PROCEDURE — 99214 OFFICE O/P EST MOD 30 MIN: CPT | Performed by: OPHTHALMOLOGY

## 2023-11-01 ASSESSMENT — REFRACTION_AUTOREFRACTION
OD_CYLINDER: -0.50
OD_SPHERE: +1.50
OD_AXIS: 107
OS_AXIS: 114
OS_SPHERE: +1.50
OS_CYLINDER: -0.50

## 2023-11-01 ASSESSMENT — REFRACTION_MANIFEST
OD_CYLINDER: -0.50
OD_AXIS: 107
OS_VA1: 20/30
OS_CYLINDER: -0.50
OS_AXIS: 114
OD_SPHERE: +1.50
OD_VA1: 20/50
OS_SPHERE: +1.50

## 2023-11-01 ASSESSMENT — SPHEQUIV_DERIVED
OS_SPHEQUIV: 1.25
OD_SPHEQUIV: 1.25
OS_SPHEQUIV: 1.25
OD_SPHEQUIV: 1.25

## 2023-11-01 ASSESSMENT — CONFRONTATIONAL VISUAL FIELD TEST (CVF)
OD_FINDINGS: FULL
OS_FINDINGS: FULL

## 2024-01-05 NOTE — H&P ADULT - NSHPPHYSICALEXAM_GEN_ALL_CORE
" Current Eye Medications:  AREDS2 daily. Refresh drops several times a day.      Subjective:  Comprehensive Eye Exam.  She wears distance glasses for night driving, but only wears them on a rare occasion.  She has noticed a progression of slightly decreased vision in her left eye.  Distance vision in her right eye is good, without correction.  She is wondering if she needs a Yag Capsulotomy, left eye.  She wears +1.25 or +1.50 over-the-counter readers which work adequately.      Last visit with Dr. Flores was about 6 months ago.  Next appointment is in February.      Objective:  See Ophthalmology Exam.       Assessment:  Karis Youssef is a 71 year old female who presents with:   Encounter Diagnoses   Name Primary?    Encounter for examination of eyes and vision with abnormal findings Yes    Pseudophakia - Both Eyes     Posterior vitreous detachment of right eye     Choroidal nevus of left eye     Myopia of both eyes with regular astigmatism and presbyopia        Plan:  Continue artificial tears up to four times a day as needed (Refresh Optive, Systane Balance, or TheraTears. Avoid generic artificial tears or \"get the red out\" drops).      Continue AREDS2 eye vitamins by mouth    Continue care with Dr. Flores to monitor choroidal nevus left eye     Recommend YAG capsulotomy (laser to help clear the vision) in the left eye at your convenience.     Tiffanie Leroy MD  [phone]     " PHYSICAL EXAM:    Constitutional: NAD, awake and alert, well-developed  HEENT: PERR, EOMI, Normal Hearing, MMM  Neck: Soft and supple, No LAD, No JVD  Respiratory: Breath sounds are clear bilaterally, No wheezing, rales or rhonchi  Cardiovascular: S1 and S2, regular rate and rhythm, no Murmurs, gallops or rubs  Gastrointestinal: Bowel Sounds present, soft, nontender, nondistended, no guarding, no rebound  Extremities: No peripheral edema  Vascular: 2+ peripheral pulses  Neurological: A/O x 3, no focal deficits  Musculoskeletal: 5/5 strength b/l upper and lower extremities  Skin: No rashes  rectal : deferred, not indicated

## 2024-03-01 ENCOUNTER — ASC (OUTPATIENT)
Dept: URBAN - METROPOLITAN AREA SURGERY 8 | Facility: SURGERY | Age: 74
Setting detail: OPHTHALMOLOGY
End: 2024-03-01
Payer: COMMERCIAL

## 2024-03-01 DIAGNOSIS — H25.11: ICD-10-CM

## 2024-03-01 PROCEDURE — 66984 XCAPSL CTRC RMVL W/O ECP: CPT | Mod: RT | Performed by: OPHTHALMOLOGY

## 2024-03-02 ENCOUNTER — OFFICE (OUTPATIENT)
Dept: URBAN - METROPOLITAN AREA CLINIC 94 | Facility: CLINIC | Age: 74
Setting detail: OPHTHALMOLOGY
End: 2024-03-02
Payer: COMMERCIAL

## 2024-03-02 ENCOUNTER — RX ONLY (RX ONLY)
Age: 74
End: 2024-03-02

## 2024-03-02 DIAGNOSIS — Z96.1: ICD-10-CM

## 2024-03-02 PROCEDURE — 99024 POSTOP FOLLOW-UP VISIT: CPT | Performed by: REGISTERED NURSE

## 2024-03-02 ASSESSMENT — REFRACTION_MANIFEST
OD_CYLINDER: -0.50
OD_AXIS: 107
OS_CYLINDER: -0.50
OD_VA1: 20/50
OS_VA1: 20/30
OD_SPHERE: +1.50
OS_SPHERE: +1.50
OS_AXIS: 114

## 2024-03-02 ASSESSMENT — SPHEQUIV_DERIVED
OD_SPHEQUIV: 1.25
OS_SPHEQUIV: 1.25

## 2024-03-08 ENCOUNTER — OFFICE (OUTPATIENT)
Dept: URBAN - METROPOLITAN AREA CLINIC 94 | Facility: CLINIC | Age: 74
Setting detail: OPHTHALMOLOGY
End: 2024-03-08
Payer: COMMERCIAL

## 2024-03-08 ENCOUNTER — RX ONLY (RX ONLY)
Age: 74
End: 2024-03-08

## 2024-03-08 DIAGNOSIS — H25.12: ICD-10-CM

## 2024-03-08 DIAGNOSIS — Z96.1: ICD-10-CM

## 2024-03-08 PROCEDURE — 99024 POSTOP FOLLOW-UP VISIT: CPT | Performed by: PHYSICIAN ASSISTANT

## 2024-03-08 ASSESSMENT — SPHEQUIV_DERIVED
OS_SPHEQUIV: 1.25
OD_SPHEQUIV: 1.25

## 2024-03-08 ASSESSMENT — REFRACTION_MANIFEST
OD_AXIS: 107
OS_AXIS: 114
OS_VA1: 20/30
OD_CYLINDER: -0.50
OS_SPHERE: +1.50
OS_CYLINDER: -0.50
OD_SPHERE: +1.50
OD_VA1: 20/50

## 2024-04-05 ENCOUNTER — ASC (OUTPATIENT)
Dept: URBAN - METROPOLITAN AREA SURGERY 8 | Facility: SURGERY | Age: 74
Setting detail: OPHTHALMOLOGY
End: 2024-04-05
Payer: COMMERCIAL

## 2024-04-05 DIAGNOSIS — H25.12: ICD-10-CM

## 2024-04-05 PROCEDURE — 66984 XCAPSL CTRC RMVL W/O ECP: CPT | Mod: 79,LT | Performed by: OPHTHALMOLOGY

## 2024-04-06 ENCOUNTER — OFFICE (OUTPATIENT)
Dept: URBAN - METROPOLITAN AREA CLINIC 94 | Facility: CLINIC | Age: 74
Setting detail: OPHTHALMOLOGY
End: 2024-04-06
Payer: COMMERCIAL

## 2024-04-06 DIAGNOSIS — Z96.1: ICD-10-CM

## 2024-04-06 PROCEDURE — 99024 POSTOP FOLLOW-UP VISIT: CPT | Performed by: REGISTERED NURSE

## 2024-04-12 ENCOUNTER — RX ONLY (RX ONLY)
Age: 74
End: 2024-04-12

## 2024-04-12 ENCOUNTER — OFFICE (OUTPATIENT)
Dept: URBAN - METROPOLITAN AREA CLINIC 112 | Facility: CLINIC | Age: 74
Setting detail: OPHTHALMOLOGY
End: 2024-04-12
Payer: COMMERCIAL

## 2024-04-12 DIAGNOSIS — Z96.1: ICD-10-CM

## 2024-04-12 PROCEDURE — 99024 POSTOP FOLLOW-UP VISIT: CPT | Performed by: OPHTHALMOLOGY

## 2024-05-10 ENCOUNTER — OFFICE (OUTPATIENT)
Dept: URBAN - METROPOLITAN AREA CLINIC 112 | Facility: CLINIC | Age: 74
Setting detail: OPHTHALMOLOGY
End: 2024-05-10
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
## 2024-05-10 PROCEDURE — NO SHOW FE NO SHOW FEE: Performed by: OPHTHALMOLOGY

## 2024-08-19 ENCOUNTER — OFFICE (OUTPATIENT)
Dept: URBAN - METROPOLITAN AREA CLINIC 112 | Facility: CLINIC | Age: 74
Setting detail: OPHTHALMOLOGY
End: 2024-08-19
Payer: COMMERCIAL

## 2024-08-19 DIAGNOSIS — H47.233: ICD-10-CM

## 2024-08-19 DIAGNOSIS — Z96.1: ICD-10-CM

## 2024-08-19 DIAGNOSIS — H40.033: ICD-10-CM

## 2024-08-19 DIAGNOSIS — H43.393: ICD-10-CM

## 2024-08-19 PROCEDURE — 99213 OFFICE O/P EST LOW 20 MIN: CPT | Performed by: OPHTHALMOLOGY

## 2024-08-19 PROCEDURE — 92083 EXTENDED VISUAL FIELD XM: CPT | Performed by: OPHTHALMOLOGY

## 2024-08-19 PROCEDURE — 92133 CPTRZD OPH DX IMG PST SGM ON: CPT | Performed by: OPHTHALMOLOGY

## 2024-08-19 ASSESSMENT — CONFRONTATIONAL VISUAL FIELD TEST (CVF)
OD_FINDINGS: FULL
OS_FINDINGS: FULL

## 2025-02-17 ENCOUNTER — OFFICE (OUTPATIENT)
Dept: URBAN - METROPOLITAN AREA CLINIC 112 | Facility: CLINIC | Age: 75
Setting detail: OPHTHALMOLOGY
End: 2025-02-17
Payer: COMMERCIAL

## 2025-02-17 DIAGNOSIS — H43.393: ICD-10-CM

## 2025-02-17 DIAGNOSIS — H47.233: ICD-10-CM

## 2025-02-17 DIAGNOSIS — Z96.1: ICD-10-CM

## 2025-02-17 DIAGNOSIS — H40.033: ICD-10-CM

## 2025-02-17 PROCEDURE — 92250 FUNDUS PHOTOGRAPHY W/I&R: CPT | Performed by: OPHTHALMOLOGY

## 2025-02-17 PROCEDURE — 92014 COMPRE OPH EXAM EST PT 1/>: CPT | Performed by: OPHTHALMOLOGY

## 2025-02-17 PROCEDURE — 92020 GONIOSCOPY: CPT | Performed by: OPHTHALMOLOGY

## 2025-02-17 ASSESSMENT — REFRACTION_MANIFEST
OS_AXIS: 114
OD_SPHERE: +1.50
OD_AXIS: 107
OS_VA1: 20/30
OS_CYLINDER: -0.50
OD_CYLINDER: -0.50
OS_SPHERE: +1.50
OD_VA1: 20/50

## 2025-02-17 ASSESSMENT — KERATOMETRY
OD_K1POWER_DIOPTERS: 45.00
OD_K2POWER_DIOPTERS: 45.25
OS_K2POWER_DIOPTERS: 46.25
METHOD_AUTO_MANUAL: AUTO
OS_K1POWER_DIOPTERS: 45.25
OD_AXISANGLE_DEGREES: 029
OS_AXISANGLE_DEGREES: 093

## 2025-02-17 ASSESSMENT — PACHYMETRY
OS_CT_CORRECTION: 1
OD_CT_UM: 559
OS_CT_UM: 529
OD_CT_CORRECTION: -1

## 2025-02-17 ASSESSMENT — VISUAL ACUITY
OD_BCVA: 20/20
OS_BCVA: 20/20

## 2025-02-17 ASSESSMENT — REFRACTION_AUTOREFRACTION
OD_CYLINDER: -0.50
OD_SPHERE: +0.25
OS_CYLINDER: -0.25
OD_AXIS: 114
OS_SPHERE: -0.25
OS_AXIS: 026

## 2025-02-17 ASSESSMENT — CONFRONTATIONAL VISUAL FIELD TEST (CVF)
OD_FINDINGS: FULL
OS_FINDINGS: FULL

## 2025-02-17 ASSESSMENT — TONOMETRY
OD_IOP_MMHG: 16
OS_IOP_MMHG: 16

## 2025-02-17 ASSESSMENT — CORNEAL EDEMA CLINICAL DESCRIPTION: OS_CORNEALEDEMA: 2+
